# Patient Record
(demographics unavailable — no encounter records)

---

## 2025-01-30 NOTE — ASSESSMENT
[FreeTextEntry1] : Patient has a left index trigger finger of several weeks duration without history of trauma.  The pre-existing arthritis is clinically asymptomatic on today's exam.  My impression is that this patient has stenosing tenosynovitis of the left index finger, more commonly known as a trigger finger.    The risks benefits and alternatives of treatment were discussed ranging from conservative to aggressive forms of treatment.  This included (but was not limited to) pain, infection, subcutaneous atrophy, skin depigmentation, tendon rupture, recurrence, incomplete relief of symptoms, tissue loss etc...  They agreed to undergo the steroid injection.   Under informed consent and sterile conditions, 1/2 cc of 2% plain lidocaine  and 1/2 cc of Kenalog 10 was precisely injected into the patient's finger.   A sterile Band-Aid was placed and a home program was elected over occupational therapy.    It is my hope that this significantly alleviates the patient's symptoms. If symptoms are not fully resolved in the next 4-6 weeks they were encouraged to return to the office for reevaluation. Patient may also consider other forms of conservative care or surgical decompression which was discussed in the office today.   If symptoms are resolved they can followup on a as-needed basis.

## 2025-01-30 NOTE — HISTORY OF PRESENT ILLNESS
[FreeTextEntry1] : Patient returns with bilateral second digit finger swelling.  Patient denies any specific injury.  She has not medically treated her symptoms.  She complains of swelling of the fingers especially on the left second digit. Patient is currently not experiencing  bilateral hand numbness or right TFCC pain.

## 2025-01-30 NOTE — PHYSICAL EXAM
[de-identified] : Physical exam shows the patient to be alert and oriented x3, capable of ambulation. The patient is well-developed and well-nourished in no apparent respiratory distress. Majority of the skin is intact bilaterally in the upper extremities without lymphadenopathy at the elbows.  The wrists have a symmetric range of motion bilaterally. There is no tenderness over the scaphoid, scapholunate or lunotriquetral ligaments bilaterally. There is a negative Cueto test bilaterally. There is negative tenderness over the radial ulnar joint or TFCC and no evidence of instability bilaterally. No tenderness over the pisotriquetral or hamate hook or CMC joint bilaterally. There is 5 over 5 strength of the wrists bilaterally.  There is tenderness over the A1 pulley of the left index finger with locking upon compression of the A1 pulley.  There is no tenderness over the MP PIP or DIP joint.  Upon making a fist she is 4 cm from the palm passively correctable to 1 cm.  There is good capillary refill of the digits bilaterally.There is no masses or sensitivity over the median and ulnar nerves at the level of the wrist. There is a negative Tinel's and negative Phalen's sign bilaterally. The sensation is grossly intact bilaterally. [de-identified] : PA lateral and oblique of the index finger shows degenerative changes sclerosis and osteophytes of the DIP joints greater than MP and PIP without evidence of soft tissue calcifications.